# Patient Record
Sex: FEMALE | Employment: OTHER | URBAN - NONMETROPOLITAN AREA
[De-identification: names, ages, dates, MRNs, and addresses within clinical notes are randomized per-mention and may not be internally consistent; named-entity substitution may affect disease eponyms.]

---

## 2023-10-27 ENCOUNTER — HOSPITAL ENCOUNTER (OUTPATIENT)
Age: 88
End: 2023-10-27
Attending: INTERNAL MEDICINE
Payer: MEDICARE

## 2023-10-27 VITALS
HEIGHT: 64 IN | SYSTOLIC BLOOD PRESSURE: 124 MMHG | BODY MASS INDEX: 18.61 KG/M2 | WEIGHT: 109 LBS | DIASTOLIC BLOOD PRESSURE: 70 MMHG

## 2023-10-27 DIAGNOSIS — I25.5 ISCHEMIC CARDIOMYOPATHY: ICD-10-CM

## 2023-10-27 DIAGNOSIS — R00.2 PALPITATIONS: ICD-10-CM

## 2023-10-27 DIAGNOSIS — I25.2 OLD MYOCARDIAL INFARCTION: ICD-10-CM

## 2023-10-27 LAB — ECHO BSA: 1.49 M2

## 2023-10-27 PROCEDURE — 93306 TTE W/DOPPLER COMPLETE: CPT

## 2023-10-27 PROCEDURE — 93225 XTRNL ECG REC<48 HRS REC: CPT

## 2023-10-29 LAB
ECHO AO ASC DIAM: 3.3 CM
ECHO AO ASCENDING AORTA INDEX: 2.19 CM/M2
ECHO AO ROOT DIAM: 3.4 CM
ECHO AO ROOT INDEX: 2.25 CM/M2
ECHO AR MAX VEL PISA: 3.5 M/S
ECHO AV AREA PEAK VELOCITY: 1.3 CM2
ECHO AV AREA VTI: 1.2 CM2
ECHO AV AREA/BSA PEAK VELOCITY: 0.9 CM2/M2
ECHO AV AREA/BSA VTI: 0.8 CM2/M2
ECHO AV MEAN GRADIENT: 25 MMHG
ECHO AV MEAN VELOCITY: 2.3 M/S
ECHO AV PEAK GRADIENT: 47 MMHG
ECHO AV PEAK VELOCITY: 3.4 M/S
ECHO AV REGURGITANT PHT: 832 MS
ECHO AV VELOCITY RATIO: 0.41
ECHO AV VTI: 65.3 CM
ECHO BSA: 1.49 M2
ECHO EST RA PRESSURE: 3 MMHG
ECHO IVC PROX: 1.8 CM
ECHO LA AREA 2C: 18.6 CM2
ECHO LA AREA 4C: 19.9 CM2
ECHO LA DIAMETER INDEX: 2.65 CM/M2
ECHO LA DIAMETER: 4 CM
ECHO LA MAJOR AXIS: 5.9 CM
ECHO LA MINOR AXIS: 5.5 CM
ECHO LA TO AORTIC ROOT RATIO: 1.18
ECHO LA VOL 2C: 51 ML (ref 22–52)
ECHO LA VOL 4C: 55 ML (ref 22–52)
ECHO LA VOL BP: 54 ML (ref 22–52)
ECHO LA VOL/BSA BIPLANE: 36 ML/M2 (ref 16–34)
ECHO LA VOLUME INDEX A2C: 34 ML/M2 (ref 16–34)
ECHO LA VOLUME INDEX A4C: 36 ML/M2 (ref 16–34)
ECHO LV FRACTIONAL SHORTENING: 34 % (ref 28–44)
ECHO LV INTERNAL DIMENSION DIASTOLE INDEX: 2.32 CM/M2
ECHO LV INTERNAL DIMENSION DIASTOLIC: 3.5 CM (ref 3.9–5.3)
ECHO LV INTERNAL DIMENSION SYSTOLIC INDEX: 1.52 CM/M2
ECHO LV INTERNAL DIMENSION SYSTOLIC: 2.3 CM
ECHO LV IVSD: 1.5 CM (ref 0.6–0.9)
ECHO LV MASS 2D: 183 G (ref 67–162)
ECHO LV MASS INDEX 2D: 121.2 G/M2 (ref 43–95)
ECHO LV POSTERIOR WALL DIASTOLIC: 1.4 CM (ref 0.6–0.9)
ECHO LV RELATIVE WALL THICKNESS RATIO: 0.8
ECHO LVOT AREA: 3.1 CM2
ECHO LVOT AV VTI INDEX: 0.39
ECHO LVOT DIAM: 2 CM
ECHO LVOT MEAN GRADIENT: 3 MMHG
ECHO LVOT PEAK GRADIENT: 8 MMHG
ECHO LVOT PEAK VELOCITY: 1.4 M/S
ECHO LVOT STROKE VOLUME INDEX: 53 ML/M2
ECHO LVOT SV: 80.1 ML
ECHO LVOT VTI: 25.5 CM
ECHO MV AREA VTI: 1.8 CM2
ECHO MV LVOT VTI INDEX: 1.71
ECHO MV MAX VELOCITY: 1.6 M/S
ECHO MV MEAN GRADIENT: 3 MMHG
ECHO MV MEAN VELOCITY: 0.8 M/S
ECHO MV PEAK GRADIENT: 10 MMHG
ECHO MV VTI: 43.5 CM
ECHO PULMONARY ARTERY END DIASTOLIC PRESSURE: 19 MMHG
ECHO PULMONARY ARTERY END DIASTOLIC PRESSURE: 7 MMHG
ECHO PV MAX VELOCITY: 1.5 M/S
ECHO PV MAX VELOCITY: 2.2 M/S
ECHO PV PEAK GRADIENT: 9 MMHG
ECHO PV REGURGITANT MAX VELOCITY: 1.3 M/S
ECHO RA AREA 4C: 13 CM2
ECHO RA END SYSTOLIC VOLUME APICAL 4 CHAMBER INDEX BSA: 18 ML/M2
ECHO RA VOLUME: 27 ML
ECHO RIGHT VENTRICULAR SYSTOLIC PRESSURE (RVSP): 33 MMHG
ECHO RV INTERNAL DIMENSION: 3.1 CM
ECHO TV REGURGITANT MAX VELOCITY: 2.73 M/S
ECHO TV REGURGITANT PEAK GRADIENT: 30 MMHG

## 2023-10-31 LAB — ECHO BSA: 1.49 M2

## 2024-01-01 ENCOUNTER — HOSPITAL ENCOUNTER (EMERGENCY)
Age: 89
End: 2024-05-03
Attending: EMERGENCY MEDICINE
Payer: MEDICARE

## 2024-01-01 ENCOUNTER — APPOINTMENT (OUTPATIENT)
Age: 89
DRG: 544 | End: 2024-01-01
Payer: MEDICARE

## 2024-01-01 ENCOUNTER — HOSPITAL ENCOUNTER (INPATIENT)
Age: 89
LOS: 2 days | Discharge: NURSING, REHAB OR LONG-TERM CARE | DRG: 544 | End: 2024-05-01
Attending: EMERGENCY MEDICINE | Admitting: INTERNAL MEDICINE
Payer: MEDICARE

## 2024-01-01 VITALS
HEART RATE: 62 BPM | WEIGHT: 96 LBS | DIASTOLIC BLOOD PRESSURE: 71 MMHG | HEIGHT: 64 IN | BODY MASS INDEX: 16.39 KG/M2 | SYSTOLIC BLOOD PRESSURE: 152 MMHG | RESPIRATION RATE: 18 BRPM | TEMPERATURE: 98.1 F | OXYGEN SATURATION: 98 %

## 2024-01-01 DIAGNOSIS — R26.81 GAIT INSTABILITY: ICD-10-CM

## 2024-01-01 DIAGNOSIS — S32.591A CLOSED FRACTURE OF RAMUS OF RIGHT PUBIS, INITIAL ENCOUNTER (HCC): Primary | ICD-10-CM

## 2024-01-01 LAB
ANION GAP SERPL CALC-SCNC: 4 MMOL/L (ref 3–18)
ANION GAP SERPL CALC-SCNC: 5 MMOL/L (ref 3–18)
ANION GAP SERPL CALC-SCNC: 6 MMOL/L (ref 3–18)
APPEARANCE UR: CLEAR
BACTERIA URNS QL MICRO: ABNORMAL /HPF
BASOPHILS # BLD: 0.1 K/UL (ref 0–0.1)
BASOPHILS NFR BLD: 1 % (ref 0–2)
BILIRUB UR QL: NEGATIVE
BUN SERPL-MCNC: 29 MG/DL (ref 7–18)
BUN SERPL-MCNC: 31 MG/DL (ref 7–18)
BUN SERPL-MCNC: 31 MG/DL (ref 7–18)
BUN/CREAT SERPL: 12 (ref 12–20)
BUN/CREAT SERPL: 13 (ref 12–20)
BUN/CREAT SERPL: 17 (ref 12–20)
CA-I BLD-MCNC: 8.3 MG/DL (ref 8.5–10.1)
CA-I BLD-MCNC: 9 MG/DL (ref 8.5–10.1)
CA-I BLD-MCNC: 9.6 MG/DL (ref 8.5–10.1)
CHLORIDE SERPL-SCNC: 109 MMOL/L (ref 100–111)
CHLORIDE SERPL-SCNC: 110 MMOL/L (ref 100–111)
CHLORIDE SERPL-SCNC: 111 MMOL/L (ref 100–111)
CO2 SERPL-SCNC: 26 MMOL/L (ref 21–32)
CO2 SERPL-SCNC: 28 MMOL/L (ref 21–32)
CO2 SERPL-SCNC: 28 MMOL/L (ref 21–32)
COLOR UR: YELLOW
CREAT SERPL-MCNC: 1.71 MG/DL (ref 0.6–1.3)
CREAT SERPL-MCNC: 2.38 MG/DL (ref 0.6–1.3)
CREAT SERPL-MCNC: 2.61 MG/DL (ref 0.6–1.3)
DIFFERENTIAL METHOD BLD: ABNORMAL
EKG ATRIAL RATE: 84 BPM
EKG DIAGNOSIS: NORMAL
EKG Q-T INTERVAL: 478 MS
EKG QRS DURATION: 164 MS
EKG QTC CALCULATION (BAZETT): 501 MS
EKG R AXIS: -66 DEGREES
EKG T AXIS: 111 DEGREES
EKG VENTRICULAR RATE: 66 BPM
EOSINOPHIL # BLD: 0.4 K/UL (ref 0–0.4)
EOSINOPHIL # BLD: 0.5 K/UL (ref 0–0.4)
EOSINOPHIL # BLD: 0.7 K/UL (ref 0–0.4)
EOSINOPHIL NFR BLD: 10 % (ref 0–5)
EOSINOPHIL NFR BLD: 3 % (ref 0–5)
EOSINOPHIL NFR BLD: 5 % (ref 0–5)
EPITH CASTS URNS QL MICRO: ABNORMAL /LPF (ref 0–20)
ERYTHROCYTE [DISTWIDTH] IN BLOOD BY AUTOMATED COUNT: 13.7 % (ref 11.6–14.5)
ERYTHROCYTE [DISTWIDTH] IN BLOOD BY AUTOMATED COUNT: 13.7 % (ref 11.6–14.5)
ERYTHROCYTE [DISTWIDTH] IN BLOOD BY AUTOMATED COUNT: 13.8 % (ref 11.6–14.5)
GLUCOSE SERPL-MCNC: 120 MG/DL (ref 74–99)
GLUCOSE SERPL-MCNC: 76 MG/DL (ref 74–99)
GLUCOSE SERPL-MCNC: 94 MG/DL (ref 74–99)
GLUCOSE UR STRIP.AUTO-MCNC: NEGATIVE MG/DL
HCT VFR BLD AUTO: 33.7 % (ref 35–45)
HCT VFR BLD AUTO: 35 % (ref 35–45)
HCT VFR BLD AUTO: 35.8 % (ref 35–45)
HGB BLD-MCNC: 10.7 G/DL (ref 12–16)
HGB BLD-MCNC: 11.6 G/DL (ref 12–16)
HGB BLD-MCNC: 11.7 G/DL (ref 12–16)
HGB UR QL STRIP: ABNORMAL
IMM GRANULOCYTES # BLD AUTO: 0 K/UL (ref 0–0.04)
IMM GRANULOCYTES NFR BLD AUTO: 0 % (ref 0–0.5)
KETONES UR QL STRIP.AUTO: ABNORMAL MG/DL
LEUKOCYTE ESTERASE UR QL STRIP.AUTO: ABNORMAL
LYMPHOCYTES # BLD: 0.8 K/UL (ref 0.9–3.6)
LYMPHOCYTES # BLD: 0.9 K/UL (ref 0.9–3.6)
LYMPHOCYTES # BLD: 1.1 K/UL (ref 0.9–3.6)
LYMPHOCYTES NFR BLD: 15 % (ref 21–52)
LYMPHOCYTES NFR BLD: 8 % (ref 21–52)
LYMPHOCYTES NFR BLD: 9 % (ref 21–52)
MCH RBC QN AUTO: 31.1 PG (ref 24–34)
MCH RBC QN AUTO: 31.4 PG (ref 24–34)
MCH RBC QN AUTO: 32.1 PG (ref 24–34)
MCHC RBC AUTO-ENTMCNC: 31.8 G/DL (ref 31–37)
MCHC RBC AUTO-ENTMCNC: 32.7 G/DL (ref 31–37)
MCHC RBC AUTO-ENTMCNC: 33.1 G/DL (ref 31–37)
MCV RBC AUTO: 96 FL (ref 78–100)
MCV RBC AUTO: 97 FL (ref 78–100)
MCV RBC AUTO: 98 FL (ref 78–100)
MONOCYTES # BLD: 0.4 K/UL (ref 0.05–1.2)
MONOCYTES # BLD: 0.5 K/UL (ref 0.05–1.2)
MONOCYTES # BLD: 0.5 K/UL (ref 0.05–1.2)
MONOCYTES NFR BLD: 4 % (ref 3–10)
MONOCYTES NFR BLD: 5 % (ref 3–10)
MONOCYTES NFR BLD: 6 % (ref 3–10)
NEUTS SEG # BLD: 4.9 K/UL (ref 1.8–8)
NEUTS SEG # BLD: 7.5 K/UL (ref 1.8–8)
NEUTS SEG # BLD: 9.8 K/UL (ref 1.8–8)
NEUTS SEG NFR BLD: 68 % (ref 40–73)
NEUTS SEG NFR BLD: 80 % (ref 40–73)
NEUTS SEG NFR BLD: 84 % (ref 40–73)
NITRITE UR QL STRIP.AUTO: NEGATIVE
NRBC # BLD: 0 K/UL (ref 0–0.01)
NRBC BLD-RTO: 0 PER 100 WBC
PH UR STRIP: 5.5 (ref 5–8)
PLATELET # BLD AUTO: 75 K/UL (ref 135–420)
PLATELET # BLD AUTO: 83 K/UL (ref 135–420)
PLATELET # BLD AUTO: 84 K/UL (ref 135–420)
PLATELET COMMENT: ABNORMAL
PLATELET COMMENT: ABNORMAL
PLATELET COMMENT: NORMAL
PMV BLD AUTO: 12.8 FL (ref 9.2–11.8)
PMV BLD AUTO: 13.3 FL (ref 9.2–11.8)
PMV BLD AUTO: 13.4 FL (ref 9.2–11.8)
POTASSIUM SERPL-SCNC: 4.3 MMOL/L (ref 3.5–5.5)
POTASSIUM SERPL-SCNC: 4.7 MMOL/L (ref 3.5–5.5)
POTASSIUM SERPL-SCNC: 5.3 MMOL/L (ref 3.5–5.5)
PROT UR STRIP-MCNC: 30 MG/DL
RBC # BLD AUTO: 3.44 M/UL (ref 4.2–5.3)
RBC # BLD AUTO: 3.61 M/UL (ref 4.2–5.3)
RBC # BLD AUTO: 3.73 M/UL (ref 4.2–5.3)
RBC #/AREA URNS HPF: ABNORMAL /HPF (ref 0–2)
RBC MORPH BLD: ABNORMAL
SODIUM SERPL-SCNC: 141 MMOL/L (ref 136–145)
SODIUM SERPL-SCNC: 143 MMOL/L (ref 136–145)
SODIUM SERPL-SCNC: 143 MMOL/L (ref 136–145)
SP GR UR REFRACTOMETRY: 1.02 (ref 1–1.03)
UROBILINOGEN UR QL STRIP.AUTO: 0.2 EU/DL (ref 0.2–1)
WBC # BLD AUTO: 11.7 K/UL (ref 4.6–13.2)
WBC # BLD AUTO: 7.2 K/UL (ref 4.6–13.2)
WBC # BLD AUTO: 9.4 K/UL (ref 4.6–13.2)
WBC URNS QL MICRO: ABNORMAL /HPF (ref 0–4)

## 2024-01-01 PROCEDURE — 6370000000 HC RX 637 (ALT 250 FOR IP): Performed by: INTERNAL MEDICINE

## 2024-01-01 PROCEDURE — 2700000000 HC OXYGEN THERAPY PER DAY

## 2024-01-01 PROCEDURE — 85025 COMPLETE CBC W/AUTO DIFF WBC: CPT

## 2024-01-01 PROCEDURE — 1100000000 HC RM PRIVATE

## 2024-01-01 PROCEDURE — 99285 EMERGENCY DEPT VISIT HI MDM: CPT

## 2024-01-01 PROCEDURE — 6360000002 HC RX W HCPCS: Performed by: INTERNAL MEDICINE

## 2024-01-01 PROCEDURE — 80048 BASIC METABOLIC PNL TOTAL CA: CPT

## 2024-01-01 PROCEDURE — 97530 THERAPEUTIC ACTIVITIES: CPT

## 2024-01-01 PROCEDURE — 2580000003 HC RX 258: Performed by: INTERNAL MEDICINE

## 2024-01-01 PROCEDURE — 97110 THERAPEUTIC EXERCISES: CPT

## 2024-01-01 PROCEDURE — 81001 URINALYSIS AUTO W/SCOPE: CPT

## 2024-01-01 PROCEDURE — 71045 X-RAY EXAM CHEST 1 VIEW: CPT

## 2024-01-01 PROCEDURE — 97162 PT EVAL MOD COMPLEX 30 MIN: CPT

## 2024-01-01 PROCEDURE — 73502 X-RAY EXAM HIP UNI 2-3 VIEWS: CPT

## 2024-01-01 PROCEDURE — 94761 N-INVAS EAR/PLS OXIMETRY MLT: CPT

## 2024-01-01 PROCEDURE — 72192 CT PELVIS W/O DYE: CPT

## 2024-01-01 PROCEDURE — 36415 COLL VENOUS BLD VENIPUNCTURE: CPT

## 2024-01-01 PROCEDURE — 6360000002 HC RX W HCPCS: Performed by: EMERGENCY MEDICINE

## 2024-01-01 PROCEDURE — 99283 EMERGENCY DEPT VISIT LOW MDM: CPT

## 2024-01-01 PROCEDURE — 51798 US URINE CAPACITY MEASURE: CPT

## 2024-01-01 PROCEDURE — 93005 ELECTROCARDIOGRAM TRACING: CPT | Performed by: EMERGENCY MEDICINE

## 2024-01-01 PROCEDURE — 97166 OT EVAL MOD COMPLEX 45 MIN: CPT

## 2024-01-01 RX ORDER — ACETAMINOPHEN 325 MG/1
650 TABLET ORAL EVERY 6 HOURS PRN
Status: DISCONTINUED | OUTPATIENT
Start: 2024-01-01 | End: 2024-01-01 | Stop reason: HOSPADM

## 2024-01-01 RX ORDER — SODIUM CHLORIDE 9 MG/ML
INJECTION, SOLUTION INTRAVENOUS CONTINUOUS
Status: DISCONTINUED | OUTPATIENT
Start: 2024-01-01 | End: 2024-01-01 | Stop reason: HOSPADM

## 2024-01-01 RX ORDER — SODIUM CHLORIDE 0.9 % (FLUSH) 0.9 %
5-40 SYRINGE (ML) INJECTION EVERY 12 HOURS SCHEDULED
Status: DISCONTINUED | OUTPATIENT
Start: 2024-01-01 | End: 2024-01-01 | Stop reason: HOSPADM

## 2024-01-01 RX ORDER — AMLODIPINE BESYLATE 5 MG/1
10 TABLET ORAL DAILY
Status: DISCONTINUED | OUTPATIENT
Start: 2024-01-01 | End: 2024-01-01 | Stop reason: HOSPADM

## 2024-01-01 RX ORDER — SODIUM CHLORIDE 9 MG/ML
INJECTION, SOLUTION INTRAVENOUS PRN
Status: DISCONTINUED | OUTPATIENT
Start: 2024-01-01 | End: 2024-01-01 | Stop reason: HOSPADM

## 2024-01-01 RX ORDER — LIDOCAINE 4 G/G
1 PATCH TOPICAL DAILY
Status: DISCONTINUED | OUTPATIENT
Start: 2024-01-01 | End: 2024-01-01 | Stop reason: HOSPADM

## 2024-01-01 RX ORDER — LEVOTHYROXINE SODIUM 0.05 MG/1
100 TABLET ORAL DAILY
Status: DISCONTINUED | OUTPATIENT
Start: 2024-01-01 | End: 2024-01-01 | Stop reason: HOSPADM

## 2024-01-01 RX ORDER — HEPARIN SODIUM 5000 [USP'U]/ML
5000 INJECTION, SOLUTION INTRAVENOUS; SUBCUTANEOUS 2 TIMES DAILY
Status: DISCONTINUED | OUTPATIENT
Start: 2024-01-01 | End: 2024-01-01 | Stop reason: HOSPADM

## 2024-01-01 RX ORDER — ONDANSETRON 2 MG/ML
4 INJECTION INTRAMUSCULAR; INTRAVENOUS EVERY 6 HOURS PRN
Status: DISCONTINUED | OUTPATIENT
Start: 2024-01-01 | End: 2024-01-01 | Stop reason: HOSPADM

## 2024-01-01 RX ORDER — HYDROXYZINE HYDROCHLORIDE 25 MG/1
25 TABLET, FILM COATED ORAL 3 TIMES DAILY
COMMUNITY

## 2024-01-01 RX ORDER — LORATADINE 10 MG/1
10 TABLET ORAL DAILY
COMMUNITY
Start: 2017-06-26

## 2024-01-01 RX ORDER — QUETIAPINE FUMARATE 100 MG/1
100 TABLET, FILM COATED ORAL DAILY
COMMUNITY

## 2024-01-01 RX ORDER — OXYCODONE HYDROCHLORIDE AND ACETAMINOPHEN 5; 325 MG/1; MG/1
1 TABLET ORAL EVERY 6 HOURS PRN
Status: DISCONTINUED | OUTPATIENT
Start: 2024-01-01 | End: 2024-01-01 | Stop reason: HOSPADM

## 2024-01-01 RX ORDER — FENTANYL CITRATE 50 UG/ML
25 INJECTION, SOLUTION INTRAMUSCULAR; INTRAVENOUS
Status: COMPLETED | OUTPATIENT
Start: 2024-01-01 | End: 2024-01-01

## 2024-01-01 RX ORDER — POLYETHYLENE GLYCOL 3350 17 G/17G
17 POWDER, FOR SOLUTION ORAL DAILY PRN
Status: DISCONTINUED | OUTPATIENT
Start: 2024-01-01 | End: 2024-01-01 | Stop reason: HOSPADM

## 2024-01-01 RX ORDER — ENOXAPARIN SODIUM 100 MG/ML
30 INJECTION SUBCUTANEOUS DAILY
Status: DISCONTINUED | OUTPATIENT
Start: 2024-01-01 | End: 2024-01-01

## 2024-01-01 RX ORDER — SODIUM CHLORIDE 0.9 % (FLUSH) 0.9 %
5-40 SYRINGE (ML) INJECTION PRN
Status: DISCONTINUED | OUTPATIENT
Start: 2024-01-01 | End: 2024-01-01 | Stop reason: HOSPADM

## 2024-01-01 RX ORDER — OXYCODONE HYDROCHLORIDE AND ACETAMINOPHEN 5; 325 MG/1; MG/1
1 TABLET ORAL EVERY 4 HOURS PRN
Qty: 15 TABLET | Refills: 0 | Status: SHIPPED | OUTPATIENT
Start: 2024-01-01 | End: 2024-05-04

## 2024-01-01 RX ORDER — ONDANSETRON 4 MG/1
4 TABLET, ORALLY DISINTEGRATING ORAL EVERY 8 HOURS PRN
Status: DISCONTINUED | OUTPATIENT
Start: 2024-01-01 | End: 2024-01-01 | Stop reason: HOSPADM

## 2024-01-01 RX ORDER — LEVOTHYROXINE SODIUM 0.1 MG/1
100 TABLET ORAL DAILY
COMMUNITY

## 2024-01-01 RX ORDER — MORPHINE SULFATE 4 MG/ML
4 INJECTION, SOLUTION INTRAMUSCULAR; INTRAVENOUS ONCE
Status: COMPLETED | OUTPATIENT
Start: 2024-01-01 | End: 2024-01-01

## 2024-01-01 RX ORDER — AMLODIPINE BESYLATE 10 MG/1
10 TABLET ORAL DAILY
Status: ON HOLD | COMMUNITY
End: 2024-01-01

## 2024-01-01 RX ORDER — ACETAMINOPHEN 650 MG/1
650 SUPPOSITORY RECTAL EVERY 6 HOURS PRN
Status: DISCONTINUED | OUTPATIENT
Start: 2024-01-01 | End: 2024-01-01 | Stop reason: HOSPADM

## 2024-01-01 RX ADMIN — ENOXAPARIN SODIUM 30 MG: 100 INJECTION SUBCUTANEOUS at 08:18

## 2024-01-01 RX ADMIN — AMLODIPINE BESYLATE 10 MG: 5 TABLET ORAL at 18:07

## 2024-01-01 RX ADMIN — SODIUM CHLORIDE, PRESERVATIVE FREE 10 ML: 5 INJECTION INTRAVENOUS at 08:19

## 2024-01-01 RX ADMIN — ACETAMINOPHEN 650 MG: 325 TABLET ORAL at 18:05

## 2024-01-01 RX ADMIN — ACETAMINOPHEN 650 MG: 325 TABLET ORAL at 08:14

## 2024-01-01 RX ADMIN — MORPHINE SULFATE 4 MG: 4 INJECTION, SOLUTION INTRAMUSCULAR; INTRAVENOUS at 12:19

## 2024-01-01 RX ADMIN — LEVOTHYROXINE SODIUM 125 MCG: 0.07 TABLET ORAL at 08:14

## 2024-01-01 RX ADMIN — LEVOTHYROXINE SODIUM 100 MCG: 0.05 TABLET ORAL at 08:26

## 2024-01-01 RX ADMIN — SODIUM CHLORIDE: 9 INJECTION, SOLUTION INTRAVENOUS at 11:19

## 2024-01-01 RX ADMIN — SODIUM CHLORIDE, PRESERVATIVE FREE 10 ML: 5 INJECTION INTRAVENOUS at 08:31

## 2024-01-01 RX ADMIN — SODIUM CHLORIDE, PRESERVATIVE FREE 10 ML: 5 INJECTION INTRAVENOUS at 21:00

## 2024-01-01 RX ADMIN — FENTANYL CITRATE 25 MCG: 50 INJECTION INTRAMUSCULAR; INTRAVENOUS at 16:26

## 2024-01-01 RX ADMIN — OXYCODONE AND ACETAMINOPHEN 1 TABLET: 5; 325 TABLET ORAL at 08:26

## 2024-01-01 RX ADMIN — OXYCODONE AND ACETAMINOPHEN 1 TABLET: 5; 325 TABLET ORAL at 16:54

## 2024-01-01 RX ADMIN — AMLODIPINE BESYLATE 10 MG: 5 TABLET ORAL at 08:25

## 2024-01-01 RX ADMIN — OXYCODONE AND ACETAMINOPHEN 1 TABLET: 5; 325 TABLET ORAL at 01:48

## 2024-01-01 RX ADMIN — OXYCODONE AND ACETAMINOPHEN 1 TABLET: 5; 325 TABLET ORAL at 10:23

## 2024-01-01 RX ADMIN — SODIUM CHLORIDE: 9 INJECTION, SOLUTION INTRAVENOUS at 08:22

## 2024-01-01 RX ADMIN — LEVOTHYROXINE SODIUM 125 MCG: 0.07 TABLET ORAL at 18:06

## 2024-01-01 RX ADMIN — AMLODIPINE BESYLATE 10 MG: 5 TABLET ORAL at 08:14

## 2024-01-01 RX ADMIN — ENOXAPARIN SODIUM 30 MG: 100 INJECTION SUBCUTANEOUS at 18:05

## 2024-01-01 ASSESSMENT — PAIN SCALES - GENERAL
PAINLEVEL_OUTOF10: 10
PAINLEVEL_OUTOF10: 0
PAINLEVEL_OUTOF10: 0
PAINLEVEL_OUTOF10: 2
PAINLEVEL_OUTOF10: 4
PAINLEVEL_OUTOF10: 0
PAINLEVEL_OUTOF10: 5
PAINLEVEL_OUTOF10: 0
PAINLEVEL_OUTOF10: 6
PAINLEVEL_OUTOF10: 4
PAINLEVEL_OUTOF10: 10
PAINLEVEL_OUTOF10: 10
PAINLEVEL_OUTOF10: 0
PAINLEVEL_OUTOF10: 4
PAINLEVEL_OUTOF10: 9
PAINLEVEL_OUTOF10: 8
PAINLEVEL_OUTOF10: 3
PAINLEVEL_OUTOF10: 0
PAINLEVEL_OUTOF10: 6
PAINLEVEL_OUTOF10: 4
PAINLEVEL_OUTOF10: 0

## 2024-01-01 ASSESSMENT — PAIN SCALES - PAIN ASSESSMENT IN ADVANCED DEMENTIA (PAINAD)
BREATHING: NORMAL
BREATHING: NORMAL
NEGVOCALIZATION: OCCASIONAL MOAN/GROAN, LOW SPEECH, NEGATIVE/DISAPPROVING QUALITY
FACIALEXPRESSION: SMILING OR INEXPRESSIVE
CONSOLABILITY: NO NEED TO CONSOLE
FACIALEXPRESSION: SMILING OR INEXPRESSIVE
NEGVOCALIZATION: OCCASIONAL MOAN/GROAN, LOW SPEECH, NEGATIVE/DISAPPROVING QUALITY
CONSOLABILITY: NO NEED TO CONSOLE
BODYLANGUAGE: RELAXED
TOTALSCORE: 1
BODYLANGUAGE: RELAXED
TOTALSCORE: 1

## 2024-01-01 ASSESSMENT — PAIN DESCRIPTION - ORIENTATION
ORIENTATION: RIGHT;LEFT
ORIENTATION: MID;RIGHT
ORIENTATION: RIGHT
ORIENTATION: MID
ORIENTATION: RIGHT

## 2024-01-01 ASSESSMENT — PAIN - FUNCTIONAL ASSESSMENT
PAIN_FUNCTIONAL_ASSESSMENT: INTOLERABLE, UNABLE TO DO ANY ACTIVE OR PASSIVE ACTIVITIES
PAIN_FUNCTIONAL_ASSESSMENT: 0-10
PAIN_FUNCTIONAL_ASSESSMENT: INTOLERABLE, UNABLE TO DO ANY ACTIVE OR PASSIVE ACTIVITIES

## 2024-01-01 ASSESSMENT — PAIN SCALES - WONG BAKER
WONGBAKER_NUMERICALRESPONSE: NO HURT

## 2024-01-01 ASSESSMENT — PAIN DESCRIPTION - LOCATION
LOCATION: BACK;COCCYX
LOCATION: BACK;HIP
LOCATION: HIP;BUTTOCKS;COCCYX
LOCATION: COCCYX;BACK
LOCATION: HIP;PELVIS
LOCATION: HIP
LOCATION: HIP

## 2024-01-01 ASSESSMENT — PAIN DESCRIPTION - DESCRIPTORS
DESCRIPTORS: THROBBING
DESCRIPTORS: ACHING
DESCRIPTORS: ACHING;THROBBING
DESCRIPTORS: ACHING
DESCRIPTORS: ACHING;HEAVINESS
DESCRIPTORS: ACHING

## 2024-01-01 ASSESSMENT — PAIN DESCRIPTION - ONSET
ONSET: ON-GOING

## 2024-01-01 ASSESSMENT — PAIN DESCRIPTION - FREQUENCY
FREQUENCY: CONTINUOUS
FREQUENCY: INTERMITTENT

## 2024-01-01 ASSESSMENT — LIFESTYLE VARIABLES
HOW OFTEN DO YOU HAVE A DRINK CONTAINING ALCOHOL: NEVER
HOW MANY STANDARD DRINKS CONTAINING ALCOHOL DO YOU HAVE ON A TYPICAL DAY: PATIENT DOES NOT DRINK

## 2024-01-01 ASSESSMENT — PAIN DESCRIPTION - PAIN TYPE
TYPE: ACUTE PAIN

## 2024-04-29 PROBLEM — S32.591A: Status: ACTIVE | Noted: 2024-01-01

## 2024-04-29 PROBLEM — S32.9XXA CLOSED PELVIC STRADDLE INJURY WITH PELVIC FRACTURE, INITIAL ENCOUNTER (HCC): Status: ACTIVE | Noted: 2024-01-01

## 2024-04-29 NOTE — H&P
Skin: Negative for rash and itching.   Neurological: Negative for sensory change, focal weakness and headaches.   Psychiatric/Behavioral: Negative for depression. The patient is not nervous/anxious.          Patient history is being obtained from patient and Daugther.      Allergies   Allergen Reactions    Levothyroxine      Other Reaction(s): neurological reaction    Headaches    Penicillins      Other Reaction(s): unknown    Sulfa Antibiotics      Other Reaction(s): unknown     No current facility-administered medications on file prior to encounter.     Current Outpatient Medications on File Prior to Encounter   Medication Sig Dispense Refill    loratadine (CLARITIN) 10 MG tablet Take 1 tablet by mouth daily      amLODIPine (NORVASC) 10 MG tablet Take 1 tablet by mouth daily      levothyroxine (SYNTHROID) 125 MCG tablet Take 1 tablet by mouth daily         Past Medical History:   Diagnosis Date    Hypertension      History reviewed. No pertinent surgical history.  History reviewed. No pertinent family history.  Social History     Socioeconomic History    Marital status:      Spouse name: Not on file    Number of children: Not on file    Years of education: Not on file    Highest education level: Not on file   Occupational History    Not on file   Tobacco Use    Smoking status: Never     Passive exposure: Never    Smokeless tobacco: Never   Vaping Use    Vaping Use: Never used   Substance and Sexual Activity    Alcohol use: Never    Drug use: Never    Sexual activity: Not on file   Other Topics Concern    Not on file   Social History Narrative    Not on file     Social Determinants of Health     Financial Resource Strain: Not on file   Food Insecurity: Patient Declined (4/29/2024)    Hunger Vital Sign     Worried About Running Out of Food in the Last Year: Patient declined     Ran Out of Food in the Last Year: Patient declined   Transportation Needs: Patient Declined (4/29/2024)    PRAPARE - Transportation

## 2024-04-29 NOTE — ED PROVIDER NOTES
Ultrasound and MRI are read by the radiologist. Plain radiographic images are visualized and preliminarily interpreted by the ED Provider with the following findings: Xray Interpreted by me.  Shows pubic ramus fracture    Interpretation per the Radiologist below, if available at the time of this note:  CT PELVIS WO CONTRAST Additional Contrast? Radiologist Recommendation   Final Result      1. Acute  buckle fracture of the anterior right sacral ala.   2. Acute fractures of the right superior and inferior pubic rami.   3. Status post right total hip arthroplasty with a probable hip joint effusion.      XR HIP 2-3 VW W PELVIS RIGHT   Final Result      1. Acute appearing right superior and inferior pubic rami fractures.   2. Intact right total hip arthroplasty..      XR CHEST PORTABLE   Final Result      No acute process on portable chest.              ED COURSE and DIFFERENTIAL DIAGNOSIS/MDM   10:17 PM Differential and Considerations: Pt arrives with R hip pain/fracture.. Concern for fx. XR suggest pubic ramus fx. Continued concern for hip fracture. CT without hip fracture. Has ala fx and ramus fx. Unable to ambulate. Discussed with ortho. Recommend admission. They will follow patient on this nonsurgical. Case. Dsicussed with family likely dispo to SNF after admission and evaluation by PT/OT. Pt currently full code. Son in [place] is POA and family has mentioned making patient DNR.     Return precutions outlined.     Records Reviewed (source and summary of external notes): Prior medical records and Nursing notes.    Vitals:    Vitals:    04/29/24 1800 04/29/24 1805 04/29/24 1900 04/29/24 1929   BP: 136/75  135/67    Pulse: 63 67 65 67   Resp: 19 18 24 18   Temp:    97.9 °F (36.6 °C)   TempSrc:    Axillary   SpO2:  94% 95%    Weight:       Height:            ED COURSE          Patient was given the following medications:  Medications   amLODIPine (NORVASC) tablet 10 mg (10 mg Oral Given 4/29/24 1807)   levothyroxine

## 2024-04-29 NOTE — ED NOTES
Pt refusing IV stick and lab work at this time, MD aware.  
TRANSFER - OUT REPORT:    Verbal report given to YUNIEL Sánchez on Kyala Medley  being transferred to ICU 2 for routine progression of patient care       Report consisted of patient's Situation, Background, Assessment and   Recommendations(SBAR).     Information from the following report(s) Nurse Handoff Report, ED Encounter Summary, ED SBAR, MAR, and Recent Results was reviewed with the receiving nurse.    [place] Fall Assessment:    Presents to emergency department  because of falls (Syncope, seizure, or loss of consciousness): Yes  Age > 70: Yes  Altered Mental Status, Intoxication with alcohol or substance confusion (Disorientation, impaired judgment, poor safety awaremess, or inability to follow instructions): No  Impaired Mobility: Ambulates or transfers with assistive devices or assistance; Unable to ambulate or transer.: Yes  Nursing Judgement: No          Lines:   Peripheral IV 04/29/24 Left;Proximal;Anterior Forearm (Active)        Opportunity for questions and clarification was provided.      Patient transported with:  Monitor, O2 @ 2lpm, and Registered Nurse       
Primary Care...

## 2024-04-29 NOTE — ED TRIAGE NOTES
Pt reports fall times two over the weekend, has not got out of bed to ambulate since the falls, pt c/o right hip pain.

## 2024-04-30 NOTE — THERAPY EVALUATION
walking; muscle weakness  Therapy Prognosis: Fair  Decision Making: Medium Complexity  Discharge Recommendations: Subacute/Skilled Nursing Facility    Conditions Requiring skilled therapeutic intervention:  Performance Deficits/Impairments: Decreased functional mobility ;Decreased ADL status;Decreased ROM;Decreased strength;Decreased endurance;Decreased cognition;Decreased balance;Increased pain     Evaluation Activity Tolerance:   Activity Tolerance  Activity Tolerance: Patient limited by pain    Equipment Recommendations for Discharge:  PT Equipment Recommendations  Equipment Needed: Yes  Mobility Devices: Walker  Walker: Rolling    AM-PAC  AM-PAC Inpatient Mobility Raw Score : 8; Current research shows that an AM-PAC score of 17 or less is typically not associated with a discharge to the patient's home setting, whereas a score of 18 or greater is typically associated with a discharge to the patient's home setting.    Factors which may influence rehabilitation potential include:  Mental Status/Cognition and Pain tolerance/Management   PLAN :  Physical Therapy Plan  General Plan: 1 time a day 3-6 times a week  Current Treatment Recommendations: Strengthening;ROM;Balance training;Functional mobility training;Transfer training;Wheelchair mobility training;Stair training;Gait training;Neuromuscular re-education;Therapeutic activities    SUBJECTIVE:  Subjective  Subjective: I can't sit up, it makes my back hurt.     OBJECTIVE DATA SUMMARY:     Past Medical History:   Diagnosis Date    Hypertension    History reviewed. No pertinent surgical history.    Home Situation:  Social/Functional History  Lives With: Daughter (pt provides hx, not very reliable, states she was living with her , then with her son, then with her daughter.)  Type of Home: House  Home Layout: One level  Home Access: Stairs to enter with rails  Entrance Stairs - Number of Steps: 4  Entrance Stairs - Rails: Both  Home Equipment: Cane  ADL

## 2024-05-01 NOTE — PLAN OF CARE
Problem: Discharge Planning  Goal: Discharge to home or other facility with appropriate resources  4/29/2024 1736 by Princess Lynn RN  Outcome: Progressing  4/29/2024 1735 by Princess Lynn RN  Outcome: Progressing     Problem: Pain  Goal: Verbalizes/displays adequate comfort level or baseline comfort level  4/29/2024 1736 by Princess Lynn RN  Outcome: Progressing  4/29/2024 1735 by Princess Lynn RN  Outcome: Progressing     Problem: Neurosensory - Adult  Goal: Achieves stable or improved neurological status  Outcome: Progressing     Problem: Cardiovascular - Adult  Goal: Maintains optimal cardiac output and hemodynamic stability  Outcome: Progressing     Problem: Skin/Tissue Integrity - Adult  Goal: Skin integrity remains intact  Outcome: Progressing     Problem: Musculoskeletal - Adult  Goal: Return mobility to safest level of function  Outcome: Progressing     Problem: Gastrointestinal - Adult  Goal: Minimal or absence of nausea and vomiting  Outcome: Progressing     Problem: Genitourinary - Adult  Goal: Absence of urinary retention  Outcome: Progressing     Problem: Infection - Adult  Goal: Absence of infection at discharge  Outcome: Progressing     Problem: Metabolic/Fluid and Electrolytes - Adult  Goal: Electrolytes maintained within normal limits  Outcome: Progressing     Problem: Hematologic - Adult  Goal: Maintains hematologic stability  Outcome: Progressing     Problem: Skin/Tissue Integrity  Goal: Absence of new skin breakdown  Description: 1.  Monitor for areas of redness and/or skin breakdown  2.  Assess vascular access sites hourly  3.  Every 4-6 hours minimum:  Change oxygen saturation probe site  4.  Every 4-6 hours:  If on nasal continuous positive airway pressure, respiratory therapy assess nares and determine need for appliance change or resting period.  Outcome: Progressing     Problem: Safety - Adult  Goal: Free from fall injury  Outcome: Progressing     
  Problem: Discharge Planning  Goal: Discharge to home or other facility with appropriate resources  4/30/2024 0352 by Marielena Elizabeth RN  Outcome: Progressing  4/29/2024 1736 by Princess Lynn RN  Outcome: Progressing  4/29/2024 1735 by Princess Lynn RN  Outcome: Progressing     Problem: Pain  Goal: Verbalizes/displays adequate comfort level or baseline comfort level  4/30/2024 0352 by Marielena Elizabeth RN  Outcome: Progressing  4/29/2024 1736 by Princess Lynn RN  Outcome: Progressing  4/29/2024 1735 by Princess Lynn RN  Outcome: Progressing     Problem: Neurosensory - Adult  Goal: Achieves stable or improved neurological status  4/30/2024 0352 by Marielena Elizabeth RN  Outcome: Progressing  4/29/2024 1736 by Princess Lynn RN  Outcome: Progressing     Problem: Cardiovascular - Adult  Goal: Maintains optimal cardiac output and hemodynamic stability  4/30/2024 0352 by Marielena Elizabeth RN  Outcome: Progressing  4/29/2024 1736 by Princess Lynn RN  Outcome: Progressing     Problem: Skin/Tissue Integrity - Adult  Goal: Skin integrity remains intact  4/30/2024 0352 by Marielena Elizabeth RN  Outcome: Progressing  4/29/2024 1736 by Princess Lynn RN  Outcome: Progressing     Problem: Musculoskeletal - Adult  Goal: Return mobility to safest level of function  4/30/2024 0352 by Marielena Elizabeth RN  Outcome: Progressing  4/29/2024 1736 by Princess Lynn RN  Outcome: Progressing     Problem: Gastrointestinal - Adult  Goal: Minimal or absence of nausea and vomiting  4/30/2024 0352 by Marielena Elizabeth RN  Outcome: Progressing  4/29/2024 1736 by Princess Lynn RN  Outcome: Progressing     Problem: Genitourinary - Adult  Goal: Absence of urinary retention  4/30/2024 0352 by Marielena Elizabeth RN  Outcome: Progressing  4/29/2024 1736 by Princess Lynn RN  Outcome: Progressing     Problem: Infection - Adult  Goal: Absence of infection at discharge  4/30/2024 0352 by Marielena Elizabeth RN  Outcome: 
  Problem: Discharge Planning  Goal: Discharge to home or other facility with appropriate resources  Outcome: Progressing     Problem: Pain  Goal: Verbalizes/displays adequate comfort level or baseline comfort level  5/1/2024 0954 by Priti Owen RN  Outcome: Progressing  4/30/2024 2127 by Symone Womack RN  Outcome: Progressing  Flowsheets (Taken 4/30/2024 0900 by Princess Lynn, RN)  Verbalizes/displays adequate comfort level or baseline comfort level:   Assess pain using appropriate pain scale   Encourage patient to monitor pain and request assistance     Problem: Neurosensory - Adult  Goal: Achieves stable or improved neurological status  Outcome: Progressing     Problem: Cardiovascular - Adult  Goal: Maintains optimal cardiac output and hemodynamic stability  Outcome: Progressing     Problem: Skin/Tissue Integrity - Adult  Goal: Skin integrity remains intact  5/1/2024 0954 by Priti Owen RN  Outcome: Progressing  4/30/2024 2127 by Symone Womack RN  Outcome: Progressing     Problem: Musculoskeletal - Adult  Goal: Return mobility to safest level of function  Outcome: Progressing     Problem: Gastrointestinal - Adult  Goal: Minimal or absence of nausea and vomiting  Outcome: Progressing     Problem: Infection - Adult  Goal: Absence of infection at discharge  Outcome: Progressing     Problem: Metabolic/Fluid and Electrolytes - Adult  Goal: Electrolytes maintained within normal limits  Outcome: Progressing     Problem: Hematologic - Adult  Goal: Maintains hematologic stability  Outcome: Progressing     Problem: Skin/Tissue Integrity  Goal: Absence of new skin breakdown  Description: 1.  Monitor for areas of redness and/or skin breakdown  2.  Assess vascular access sites hourly  3.  Every 4-6 hours minimum:  Change oxygen saturation probe site  4.  Every 4-6 hours:  If on nasal continuous positive airway pressure, respiratory therapy assess nares and determine need for appliance change or 
  Problem: Pain  Goal: Verbalizes/displays adequate comfort level or baseline comfort level  Outcome: Progressing  Flowsheets (Taken 4/30/2024 0900 by Princess Lynn RN)  Verbalizes/displays adequate comfort level or baseline comfort level:   Assess pain using appropriate pain scale   Encourage patient to monitor pain and request assistance     Problem: Skin/Tissue Integrity - Adult  Goal: Skin integrity remains intact  Outcome: Progressing     Problem: Safety - Adult  Goal: Free from fall injury  Outcome: Progressing     
Glenn, Marielena, RN  Outcome: Progressing  4/29/2024 1736 by Princess Lynn RN  Outcome: Progressing     Problem: Genitourinary - Adult  Goal: Absence of urinary retention  4/30/2024 0705 by Princess Lynn RN  Outcome: Progressing  4/30/2024 0352 by Marielena Elizabeth RN  Outcome: Progressing  4/29/2024 1736 by Princess Lynn RN  Outcome: Progressing     Problem: Infection - Adult  Goal: Absence of infection at discharge  4/30/2024 0705 by Princess Lynn RN  Outcome: Progressing  4/30/2024 0352 by Marielena Elizabeth RN  Outcome: Progressing  4/29/2024 1736 by Princess Lynn RN  Outcome: Progressing     Problem: Metabolic/Fluid and Electrolytes - Adult  Goal: Electrolytes maintained within normal limits  4/30/2024 0705 by Princess Lynn RN  Outcome: Progressing  4/30/2024 0352 by Marielena Elizabeth RN  Outcome: Progressing  4/29/2024 1736 by Princess Lynn RN  Outcome: Progressing     Problem: Hematologic - Adult  Goal: Maintains hematologic stability  4/30/2024 0705 by Princess Lynn RN  Outcome: Progressing  4/30/2024 0352 by Marielena Elizabeth RN  Outcome: Progressing  4/29/2024 1736 by Princess Lynn RN  Outcome: Progressing     Problem: Skin/Tissue Integrity  Goal: Absence of new skin breakdown  Description: 1.  Monitor for areas of redness and/or skin breakdown  2.  Assess vascular access sites hourly  3.  Every 4-6 hours minimum:  Change oxygen saturation probe site  4.  Every 4-6 hours:  If on nasal continuous positive airway pressure, respiratory therapy assess nares and determine need for appliance change or resting period.  4/30/2024 0705 by Princess Lynn RN  Outcome: Progressing  4/30/2024 0352 by Marielena Elizabeth RN  Outcome: Progressing  4/29/2024 1736 by Princess Lynn RN  Outcome: Progressing     Problem: Safety - Adult  Goal: Free from fall injury  4/30/2024 0705 by Princess Lynn RN  Outcome: Progressing  4/30/2024 0352 by Glenn, Marielena, RN  Outcome: Progressing  4/29/2024

## 2024-05-01 NOTE — CONSULTS
LUIS ROBINS is a 89 y.o. female  with a past medical history significant for hypertension and hypothyroidism.   Presented to [facility] with complain of pelvic pain after fall , the patient is poor historian, denies cough or CP. In the ED X-ray and CT abdomen pelvis revealed an acute  buckle fracture of the anterior right sacral ala., acute fractures of the right superior and inferior pubic rami and probable hip joint effusion, status post right total hip arthroplasty. Patient was admitted under hospital medicine services and orthopedics was consulted as well as case management.        5/1/2024    11:15 AM 5/1/2024     9:30 AM 5/1/2024     8:56 AM 5/1/2024     8:25 AM 5/1/2024     8:00 AM 5/1/2024     7:33 AM 5/1/2024     4:10 AM   Ambulatory Bariatric Summary   Systolic 152 150   177  165   Diastolic 71 64   74  75   Pulse 62    63 70 58   Temp 98.1 °F (36.7 °C)    97.6 °F (36.4 °C)  97.9 °F (36.6 °C)   Respirations 18  18 18 18 18 18       Body mass index is 16.48 kg/m².    Past Medical History:   Diagnosis Date    Hypertension        History reviewed. No pertinent surgical history.    Current Facility-Administered Medications   Medication Dose Route Frequency Provider Last Rate Last Admin    lidocaine 4 % external patch 1 patch  1 patch TransDERmal Daily Jaime Cruz MD   1 patch at 05/01/24 1119    0.9 % sodium chloride infusion   IntraVENous Continuous Teodoro Gutierrez MD 75 mL/hr at 05/01/24 1119 New Bag at 05/01/24 1119    heparin (porcine) injection 5,000 Units  5,000 Units SubCUTAneous BID Teodoro Gutierrez MD        levothyroxine (SYNTHROID) tablet 100 mcg  100 mcg Oral Daily Teodoro Gutierrez MD   100 mcg at 05/01/24 0826    amLODIPine (NORVASC) tablet 10 mg  10 mg Oral Daily Teodoro Gutierrez MD   10 mg at 05/01/24 0825    sodium chloride flush 0.9 % injection 5-40 mL  5-40 mL IntraVENous 2 times per day Teodoro Gutierrez MD   10 mL at 05/01/24 0831    sodium chloride flush 0.9 % injection

## 2024-05-01 NOTE — CARE COORDINATION
05/01/24 1000   Service Assessment   Patient Orientation Alert and Oriented;Person   Cognition Dementia / Early Alzheimer's   History Provided By Child/Family;Medical Record   Primary Caregiver Family   Accompanied By/Relationship Alone in room   Support Systems Children   Patient's Healthcare Decision Maker is: Legal Next of Kin   PCP Verified by CM No   Last Visit to PCP Within last year  (Moved from [place] recently)   Prior Functional Level Assistance with the following:   Current Functional Level Assistance with the following:   Can patient return to prior living arrangement No   Ability to make needs known: Good   Family able to assist with home care needs: No   Would you like for me to discuss the discharge plan with any other family members/significant others, and if so, who? Yes   Financial Resources Medicare   Community Resources Transportation   CM/SW Referral ADLs/IADLs     Called daughter Sanjuanita to discuss DC POC, can not take care of pt at home any longer.  Wants SNF or LTC for pt.  Patient has SNF recommendations from PT, if pt does not participate or insurance denies auth then daughter wants to pay OOP for LTC.  Understands need for Medicaid for LTC payments but based on income and assets not likely to qualify.  Wants [facility], referral sent.

## 2024-05-01 NOTE — DISCHARGE SUMMARY
Discharge Summary       PATIENT ID: Kayla Medley  MRN: [id number]   YOB: 1925    DATE OF ADMISSION: 4/29/2024 11:00 AM    DATE OF DISCHARGE: 05/01/24    PRIMARY CARE PROVIDER: Unknown, Provider, MISTI - NP     ATTENDING PHYSICIAN: Jaime Cruz MD  DISCHARGING PROVIDER: Jaime Cruz MD        CONSULTATIONS: IP CONSULT TO CASE MANAGEMENT  IP CONSULT TO CASE MANAGEMENT  IP CONSULT TO ORTHOPEDIC SURGERY    PROCEDURES/SURGERIES: * No surgery found *    ADMITTING DIAGNOSES & HOSPITAL COURSE:   The patient PMH significant for HTN, Hypothyroid  Presented to [facility] with complain of pelvic pain after fall , the patient is poor historian, denies cough or CP,In the ED X-ray- 1. Acute  buckle fracture of the anterior right sacral ala., Acute fractures of the right superior and inferior pubic rami. Orthopedic consulted and recommendation no intervention, CM consulted for placement. IM consulted for admission,  D/w Daughter gerber santoyo and she confirm the patient code status DNR/DNI   The patient assessed at the bedside, patient is alert and oriented x4, there is no acute signs or symptoms of distress noted at this time.  Patient agrees for admission for diagnosis of pelvic fracture        DISCHARGE DIAGNOSES / PLAN:        Closed fracture of single pubic ramus of pelvis, right, initial encounter (HCC)  - X-ray- 1. Acute  buckle fracture of the anterior right sacral ala., Acute fractures of the right superior and inferior pubic rami.   - Orthopedic consulted and recommendation no intervention, consulted from ED  - admitted to med surg, vitals monitor, IVF support, pain control, placement   - CM consulted for placement , medically stable for d/c when accepted by SNF  - watch for urinary retention   - Avoid aggressive Narcotic            HTN: on Norvasc  Hypothyroid: on synthroid     Code Status: DNR/DNI      DISCHARGE MEDICATIONS:  Current Discharge Medication List        START taking these medications

## 2024-05-01 NOTE — CARE COORDINATION
Patient accepted to [facility], auth is back.  Family called to inform and transport arranged.  MMT not available unitl after 2000, [facility] called and able to come in 15 minutes.  Nursing made aware.

## 2024-05-02 NOTE — PROGRESS NOTES
Hospitalist Progress Note             Date of Service:  2024  NAME:  Kayla Medley  :  1925  MRN:  [id number]  Hpi- pt has pain to back and hips  Hospital course / Assessment and Plans   Principle Problems:    Closed fracture of single pubic ramus of pelvis, right, initial encounter (HCC)  - X-ray- 1. Acute  buckle fracture of the anterior right sacral ala., Acute fractures of the right superior and inferior pubic rami.   - Orthopedic consulted and recommendation no intervention, consulted from ED  - admitted to med surg, vitals monitor, IVF support, pain control, placement   - CM consulted for placement , medically stable for d/c when accepted by SNF  - watch for urinary retention   - Avoid aggressive Narcotic            HTN: on Norvasc  Hypothyroid: on synthroid     Code Status: DNR/DNI          Review of Systems:   Pertinent items are noted in HPI.       Vital Signs:    Last 24hrs VS reviewed since prior progress note. Most recent are:  Vitals:    24 0930   BP: (!) 150/64   Pulse:    Resp:    Temp:    SpO2:          Intake/Output Summary (Last 24 hours) at 2024 1109  Last data filed at 2024 0950  Gross per 24 hour   Intake 325 ml   Output 425 ml   Net -100 ml        Physical Examination:             Physical Exam:   General Appearance:   Appears in no acute distress.,  HEENT:   Moist oral mucous membranes, conjunctiva clear,   Neck:   Supple  Lungs:    No wheezes., No rales., Normal respiratory effort,   Heart:   Regular rate and rhythm  Abdomen:   Soft , Non-distended and Non-tender,   Extremities:   no edema of legs  Neuro:   alert, hard of hearing, pleasantly confused        Data Review:    Review and/or order of clinical lab test  Review and/or order of tests in the radiology section of CPT  Review and/or order of tests in the medicine section of CPT      Labs:     Recent Labs     
  Physician Progress Note      PATIENT:               LUIS ROBINS  CSN #:                  [id number]  :                       1925  ADMIT DATE:       2024 11:00 AM  DISCH DATE:        2024 2:45 PM  RESPONDING  PROVIDER #:        Jaime Cruz MD          QUERY TEXT:    Pt admitted with Closed fracture of single pubic ramus. Pt noted to have   Osteopenia in X-ray hip on . If possible, please document in progress   notes and discharge summary if you are evaluating and/or treating any of the   following:    The medical record reflects the following:  Risk Factors: Osteopenia, Age 89, Female  Clinical Indicators: H&P on  DS on  Closed fracture of single pubic   ramus  X-ray hip on  the bones are osteopenic.  Treatment: Oral Oxycodone, Oral Acetaminophen, Ortho consultation, X-ray    Thank You,  Jonelle Mcdonald S, CDS.    *  Options provided:  -- Pathological Closed fracture of single pubic ramus due to osteopenia   following fall which would not usually break a normal, healthy bone  -- Traumatic Closed fracture of single pubic ramus fracture  -- Other - I will add my own diagnosis  -- Disagree - Not applicable / Not valid  -- Disagree - Clinically unable to determine / Unknown  -- Refer to Clinical Documentation Reviewer    PROVIDER RESPONSE TEXT:    This patient has a pathological Closed fracture of single pubic ramus due to   osteopenia following fall which would not usually break a normal, healthy   bone.    Query created by: Jonelle Mcdonald on 2024 1:53 AM      Electronically signed by:  Jaime Cruz MD 2024 12:50 PM          
 conducted an initial consultation and Spiritual Assessment for Kayla Medley, who is a 89 y.o.,female. Patient’s Primary Language is: English.   According to the patient’s EMR Voodoo Affiliation is: Baptism.     The reason the Patient came to the hospital is:   Patient Active Problem List    Diagnosis Date Noted    Closed fracture of single pubic ramus of pelvis, right, initial encounter ([facility]) 04/29/2024    Closed pelvic straddle injury with pelvic fracture, initial encounter ([facility]) 04/29/2024        The  provided the following Interventions:  Initiated a relationship of care and support.    Listened empathically.  Provided chaplaincy education.  Provided information about Spiritual Care Services.  Offered prayer and assurance of continued prayers on patient's behalf.   Chart reviewed.    The following outcomes where achieved:    confirmed Patient's Voodoo Affiliation.  Patient processed feeling about current hospitalization.  Patient expressed gratitude for 's visit.    Assessment:  Patient does not have any Orthodox/cultural needs that will affect patient’s preferences in health care.  There are no spiritual or Orthodox issues which require intervention at this time.     Plan:  Chaplains will continue to follow and will provide pastoral care on an as needed/requested basis.   recommends bedside caregivers page  on duty if patient shows signs of acute spiritual or emotional distress.      ashwin Hoang  Spiritual Care Department  [phone]        Chaplain Leslie Lockhart  Spiritual Care   [phone]  
0645- assumed care and received report, alert, resting in bed, oriented to person, disoriented to time and situation at times, reoriented, supine position, brief on, external cath in place, call bell in reach, bed alarm on.    1815- set up for breakfast, assisted with set up, able to eat on her own, med's given crushed with ice cream, does not tolerate nursing care with movement much due to pain in right hip - prn tylenol given. Call bell in reach, bed alarm on.    1023- prn oxycodone given for hip pain right, PT working with client.     1130- lunch tray given - set up, assisted. Eating on her own.    1300- Received order for serrano placement - urinary retention, has not voided since last straight on night shift, bladder scan showed 30 ml.   Transfer to floor room 244.    1345- Report given to Priti BRICE - transferred to room 244.   
0650 - Bedside report received from night nurse Symone BRICE. Patient in bed with eyes closed. No distress noted at this time.     0730 - Patient yelling and screaming in pain to back and buttocks. Refused pain meds and yells every time she is touched.     0800 - Set patient up for breakfast and attempted to feed patient. Patient was spitting food out and wanted very small bites. Vitals and head to toes assessment completed.     0826 - Patient agreed to take medication but then kept trying to spit it out. Meds crushed in pudding and was able to get patient to finish taking meds.     0930 - PT in with patient. Patient refusing to get up or move. Therapy unable to work with her.    1215 - Patient continues to yell out in pain. Received one time order for 4mg of morphine IV.     Attempted to feed patient lunch but she spit all food out.     1420 - Notified by CM that patient is going to d/c to [facility]. Received order to d/c zach from BO De Leon NP. Carias and IV removed.     1430- Report called to Hayden HAMMER at [facility].     1445 - [facility] transport picked patient up and left via stretcher.   
1340 - Received report from Princess BRICE.     1345 - Patient transferred from ICU.   Patient screaming in pain when touched. Said she just wanted to be left alone. Skin assessment completed with Jordana Pathak RN. Buttocks red but blanching. Patient on 2L nc. IV to left arm with fluids running at 75mL/hr.     Patient in bed with eyes closed. No distress notes.     1630 - Assisted patient to eat dinner.     1645 - Percocet given for c/o pain to lower back and buttocks.     1745 - Patient resting without complaints.     Bedside shift change report given to YUNIEL Davis (oncoming nurse) by CARRIE Owen Rn (offgoing nurse). Report included the following information Nurse Handoff Report, MAR, and Recent Results.     
1615- ICU bed 2 set up for admission.    1645- TRANSFER - IN REPORT:    Verbal report received from Susana BRICE on Kayla Medley  being received from ER for routine progression of patient care - HIP FX right.      Report consisted of patient's Situation, Background, Assessment and   Recommendations(SBAR).     Information from the following report(s) Nurse Handoff Report was reviewed with the receiving nurse.Nurse Handoff Report, Index, ED Encounter Summary, ED SBAR, Adult Overview, MAR, Recent Results, Med Rec Status, Cardiac Rhythm AFIB/BBB/PVC - PMA LEFT UPPER CHEST, Alarm Parameters, and Neuro Assessment  Opportunity for questions and clarification was provided.        1715- Assessment completed upon patient's arrival to unit and care assumed.  Alert - oriented to person and place, disoriented to time and situation, needs frequent reorientation, gown, brief - external cath applied, yellow socks on, brief on, bed alarm on, call bell in reach, PMA left upper chest. SCD pumps on bilateral lower legs.    1805- prn tylenol given, evening meds given crushed with apple sauce.     1858- Report given to oncoming nurse.   
1900 Assumed care of pf from off going nurse JOSE Lynn RN. Pt is Alert to self only with c/o pain in R hip 4/10.     0148 PRN percocet given for pain in R hip 5/10.     0230 Pt states that she has to urinate and has not voided this shift. This nurse in to remind pt of purewick placement. Pt states that she has to get up and cannot void laying down. Bladder scan shows 210.     0501 Pt stating that she has to urinate. This nurse @bedside. Bladder qpkj=124. Provider BO De Leon NP notified. New orders to straight cath and collect urinalysis. Straight cath produced 420mL.        Bedside and Verbal shift change report given to JOSE Lynn RN (oncoming nurse) by Marielena Elizabeth RN  (offgoing nurse). Report included the following information Nurse Handoff Report, Intake/Output, MAR, and Recent Results.    
Admission Medication Reconciliation:    Information obtained from:  Patient's fill hx    Comments/Recommendations: Reviewed PTA medications and patient's allergies.    Updated PTA list to reflect recent fill history. Discussed with Dr Gutierrez who states he wants her to be on amlodipine at this time and not hydroxyzine. Also he wants to hold quetiapine for now. Will adjust Levothyroxine to 100mcg qd as she has been taking at home.        Allergies:  Levothyroxine, Penicillins, and Sulfa antibiotics    Significant PMH/Disease States:   Past Medical History:   Diagnosis Date    Hypertension      Chief Complaint for this Admission:    Chief Complaint   Patient presents with    Hip Pain     right     Prior to Admission Medications:   Prior to Admission medications    Medication Sig Start Date End Date Taking? Authorizing Provider   QUEtiapine (SEROQUEL) 100 MG tablet Take 1 tablet by mouth daily   Yes Mich Cohn MD   hydrOXYzine HCl (ATARAX) 25 MG tablet Take 1 tablet by mouth in the morning, at noon, and at bedtime   Yes Mich Cohn MD   loratadine (CLARITIN) 10 MG tablet Take 1 tablet by mouth daily 6/26/17  Yes Mich Cohn MD   levothyroxine (SYNTHROID) 100 MCG tablet Take 1 tablet by mouth daily    Mich Cohn MD   amLODIPine (NORVASC) 10 MG tablet Take 1 tablet by mouth daily  4/30/24  Mich Cohn MD [facility], [facility]    
Goals-   1- UE ADLs with setup   2- LE ADLs with min A   3- Toileting with mod A   4- EOB grooming with CGA     OCCUPATIONAL THERAPY EVALUATION    Patient: Kayla Medley (89 y.o. female)  Date: 4/30/2024  Primary Diagnosis: Gait instability [R26.81]  Closed fracture of single pubic ramus of pelvis, right, initial encounter ([facility]) [S32.591A]  Closed pelvic straddle injury with pelvic fracture, initial encounter ([facility]) [S32.9XXA]  Closed fracture of ramus of right pubis, initial encounter ([facility]) [S32.591A]       Precautions: fall,      PLOF: per chart, lives at home. Pt poor historian and unable to give accurate details on PLOF     ASSESSMENT :  Based on the objective data described below, the patient presents with declines in ADLs and mobility.    Patient will benefit from skilled intervention to address the above impairments.  Patient's rehabilitation potential is considered to be good  Factors which may influence rehabilitation potential include:   []             None noted  [x]             Mental ability/status  [x]             Medical condition  [x]             Home/family situation and support systems  [x]             Safety awareness  [x]             Pain tolerance/management  []             Other:      PLAN :  Recommendations and Planned Interventions:   [x]               Self Care Training                  [x]      Therapeutic Activities  [x]               Functional Mobility Training   []      Cognitive Retraining  [x]               Therapeutic Exercises           []      Endurance Activities  []               Balance Training                    []      Neuromuscular Re-Education  []               Visual/Perceptual Training     [x]      Home Safety Training  [x]               Patient Education                   [x]      Family Training/Education  []               Other (comment):    Frequency/Duration: Patient will be followed by occupational therapy 1-2 times per day/4-7 days per week to address 
INTERNAL MEDICINE PROGRESS NOTE      Patient: Kayla Medley   YOB: 1925   MRN: [id number]      Hospital course / Assessment and Plans   Principle Problems:    Closed fracture of single pubic ramus of pelvis, right, initial encounter ([facility])  - X-ray- 1. Acute  buckle fracture of the anterior right sacral ala., Acute fractures of the right superior and inferior pubic rami.   - Orthopedic consulted and recommendation no intervention, and will see the patient per ED  - admitted to med surg, vitals monitor, IVF support, pain control, placement   - CM consulted for placement , medically stable for d/c when accepted by SNF and ok with Orthopedic  - watch for urinary retention   - Avoid aggressive Narcotic         HTN: on Norvasc  Hypothyroid: on synthroid     Code Status: DNR/DNI      Disposition / Plans   Disposition: SNF        Subjective / ROS:   Patient states she is fine       Medical Decision Making   Chart, Images and Lab data reviewed, necessary medical Orders placed   Discussed with nursing staff     Vitals:    24 0600 24 0700 24 0706 24 0721   BP: (!) 161/64 116/60     Pulse: 67 61 80 62   Resp: 15 15 (!) 31    Temp:  97.2 °F (36.2 °C)     TempSrc:  Axillary     SpO2:  96%     Weight:       Height:         Temp (24hrs), Av.6 °F (36.4 °C), Min:97.2 °F (36.2 °C), Max:97.9 °F (36.6 °C)      Intake/Output Summary (Last 24 hours) at 2024 0731  Last data filed at 2024 0723  Gross per 24 hour   Intake 200 ml   Output 420 ml   Net -220 ml       Physical Exam:   General Appearance:   Appears in no acute distress.,  HEENT:   Moist oral mucous membranes, conjunctiva clear,   Neck:   Supple  Lungs:    No wheezes., No rales., Normal respiratory effort,   Heart:   Regular rate and rhythm  Abdomen:   Soft , Non-distended and Non-tender,   Extremities:   no edema of legs  Neuro:   alert, ? dementia    Current medications:     Current Facility-Administered Medications 
Notified by patient's primary nurse that patient has not had any urine output since the start of her shift at 7p, patient states the urge to void, but unable to void. Primary nurse scanned bladder, currently showing greater than 300 mL, due to patient having discomfort order placed for straight cath x 1.  
OT DAILY TREATMENT NOTE     Patient Name: Kayla Medley  Date:2024  : 1925  [x]  Patient  Verified  Payor: [facility] MEDICARE / Plan: [facility] MEDICARE ADVANTAGE / Product Type: *No Product type* /    In time:1100  Out time:1118  Total Treatment Time (min): 18      Treatment Area: Gait instability [R26.81]  Closed fracture of single pubic ramus of pelvis, right, initial encounter ([facility]) [S32.591A]  Closed pelvic straddle injury with pelvic fracture, initial encounter ([facility]) [S32.9XXA]  Closed fracture of ramus of right pubis, initial encounter ([facility]) [S32.591A]    SUBJECTIVE  Pre- tx Pain Level (0-10 scale): 5/10  Post- tx pain level (0/10 scale)5/10  Any medication changes, allergies to medications, adverse drug reactions, diagnosis change, or new procedure performed?: [x] No    [] Yes (see summary sheet for update)  Subjective functional status/changes:   [] No changes reported  Pt reports that she \"doesn't want to be alone\"     OBJECTIVE      18 min Therapeutic Activity:  []  See flow sheet :   Rationale: increase ROM, increase strength, and improve coordination  to improve the patient’s ability to complete bed mobility tasks with max A with poor initiation to tasks. Declined to attempt to even sit on eob, and even resists attempts          With   [] TE   [x] TA   [] neuro   [] other: Patient Education: [x] Review HEP    [x] Progressed/Changed HEP based on:   [x] positioning   [x] body mechanics   [] transfers   [] heat/ice application   [] Splint wear/care   [] Sensory re-education   [] scar management      [] other:        ASSESSMENT  Progress towards goals/ change in function: cont POC.     Patient will continue to benefit from skilled OT services to address ROM deficits and address strength deficits to attain remaining goals.     [x]  See Plan of Care  []  See progress note/recertification  []  See Discharge Summary         PLAN  [x]  Upgrade activities as tolerated       []  Continue plan of care 
will ambulate 25' with a RW with min assist.  Patient Goals   Patient Goals : to reduce pain    Education  Patient Education  Education Given To: (P) Patient  Education Provided: (P) Role of Therapy;Plan of Care;Precautions;Transfer Training;Fall Prevention Strategies  Education Method: (P) Verbal  Barriers to Learning: (P) Cognition  Education Outcome: (P) Unable to demonstrate understanding    AM-PAC - Mobility              Therapy Time   Individual Concurrent Group Co-treatment   Time In (P) 0924         Time Out (P) 0940         Minutes (P) 16                 Rebecca Uriostegui, PTA

## 2024-05-03 NOTE — ED PROVIDER NOTES
[facility] EMERGENCY DEPT  EMERGENCY DEPARTMENT ENCOUNTER       Pt Name: Kayla Medley  MRN: [id number]  Birthdate 12/25/1925  Date of evaluation: 5/3/2024  Provider: Mariposa Torres MD   PCP: Unknown, Provider, APRN - NP  Note Started: 8:36 AM 5/3/24     CHIEF COMPLAINT       Chief Complaint   Patient presents with    Dead On Arrival        HISTORY OF PRESENT ILLNESS: 1 or more elements      History From: EMS  History limited by: Nothing and Dead     Kayla Medley is a 89 y.o. female who presents to the ED via EMS called at [facility] for patient being unresponsive.  EMS called me and advised of patient's condition, patient was already on the track, unresponsive but EMS advised was DNR and at that time EMS reported patient still had a pulse.  No further history available.  At the ED arrival, EMS reported patient went into asystole 1541.     Nursing Notes were all reviewed and agreed with or any disagreements were addressed in the HPI.     REVIEW OF SYSTEMS      Review of Systems     Positives and Pertinent negatives as per HPI.    PAST HISTORY     Past Medical History:  Past Medical History:   Diagnosis Date    Hypertension        Past Surgical History:  No past surgical history on file.    Family History:  No family history on file.    Social History:  Social History     Tobacco Use    Smoking status: Never     Passive exposure: Never    Smokeless tobacco: Never   Vaping Use    Vaping Use: Never used   Substance Use Topics    Alcohol use: Never    Drug use: Never       Allergies:  Allergies   Allergen Reactions    Levothyroxine      Other Reaction(s): neurological reaction    Headaches    Penicillins      Other Reaction(s): unknown    Sulfa Antibiotics      Other Reaction(s): unknown       CURRENT MEDICATIONS      Discharge Medication List as of 5/3/2024  4:49 PM        CONTINUE these medications which have NOT CHANGED    Details   oxyCODONE-acetaminophen (PERCOCET) 5-325 MG per tablet Take 1

## 2024-05-03 NOTE — ED NOTES
Attempted to contact pt's emergency contact, daughter Sanjuanita Lyons  [phone]  unable to leave voice mail, mailbox is full.

## 2024-05-03 NOTE — ED TRIAGE NOTES
DNR pt brought to ED by EMS   Pt has no pulse and is not breathing upon arrival   Pt came from [facility]  no report was called    Upon arrival to pt, EMS states pt was hypotensive and unresponsive. Pt  in route to ED.  Pt  asystole at 1541

## 2024-05-03 NOTE — ED NOTES
Pt's daughter's boyfriend answered her phone, left message with him to please have daughter Sanjuanita call the ED